# Patient Record
Sex: MALE | Race: WHITE
[De-identification: names, ages, dates, MRNs, and addresses within clinical notes are randomized per-mention and may not be internally consistent; named-entity substitution may affect disease eponyms.]

---

## 2020-02-06 ENCOUNTER — HOSPITAL ENCOUNTER (OUTPATIENT)
Dept: HOSPITAL 56 - MW.SDS | Age: 50
Discharge: HOME | End: 2020-02-06
Attending: SURGERY
Payer: COMMERCIAL

## 2020-02-06 DIAGNOSIS — Z88.0: ICD-10-CM

## 2020-02-06 DIAGNOSIS — K42.0: Primary | ICD-10-CM

## 2020-02-06 DIAGNOSIS — Z87.891: ICD-10-CM

## 2020-02-06 PROCEDURE — 49587: CPT

## 2020-02-06 NOTE — PCM.PREANE
Preanesthetic Assessment





- Anesthesia/Transfusion/Family Hx


Anesthesia History: Prior Anesthesia Without Reaction


Family History of Anesthesia Reaction: No


Transfusion History: No Prior Transfusion(s)





- Review of Systems


General: No Symptoms


Pulmonary: No Symptoms


Cardiovascular: No Symptoms


Gastrointestinal: No Symptoms


Neurological: Headache


Other: Reports: None





- Physical Assessment


NPO Status Date: 02/06/20


NPO Status Time: 08:00


Vital Signs: 





 Last Vital Signs











Temp  97.2 F   02/06/20 10:00


 


Pulse  89   02/06/20 10:00


 


Resp  18   02/06/20 10:00


 


BP  151/84 H  02/06/20 10:00


 


Pulse Ox  98   02/06/20 10:00











Height: 6 ft 2 in


Weight: 104.326 kg


ASA Class: 2


Mental Status: Alert & Oriented x3


Airway Class: Mallampati = 2


Dentition: Reports: Dentures (upper)


ROM/Head Extension: Full


Lungs: Clear to Auscultation, Normal Respiratory Effort


Cardiovascular: Regular Rate, Regular Rhythm





- Allergies


Allergies/Adverse Reactions: 


 Allergies











Allergy/AdvReac Type Severity Reaction Status Date / Time


 


Penicillins Allergy  Hives Verified 02/05/20 10:07














- Blood


Blood Available: No





- Anesthesia Plan


Pre-Op Medication Ordered: None





- Acknowledgements


Anesthesia Type Planned: General Anesthesia


Pt an Appropriate Candidate for the Planned Anesthesia: Yes


Alternatives and Risks of Anesthesia Discussed w Pt/Guardian: Yes


Pt/Guardian Understands and Agrees with Anesthesia Plan: Yes





PreAnesthesia Questionnaire


HEENT History: Reports: Other (See Below)


Other HEENT History: upper denture


Cardiovascular History: Reports: None


Respiratory History: Reports: None


Gastrointestinal History: Reports: Other (See Below)


Other Gastrointestinal History: occasional heartburn


Genitourinary History: Reports: None


Musculoskeletal History: Reports: None


Neurological History: Reports: None


Psychiatric History: Reports: None


Endocrine/Metabolic History: Reports: None


Hematologic History: Reports: None


Immunologic History: Reports: None


Oncologic (Cancer) History: Reports: None


Dermatologic History: Reports: None





- Past Surgical History


Head Surgeries/Procedures: Reports: None


HEENT Surgical History: Reports: Tonsillectomy


Cardiovascular Surgical History: Reports: None


Respiratory Surgical History: Reports: None


GI Surgical History: Reports: None


Male  Surgical History: Reports: None


Endocrine Surgical History: Reports: None


Neurological Surgical History: Reports: None


Musculoskeletal Surgical History: Reports: None


Oncologic Surgical History: Reports: None


Dermatological Surgical History: Reports: None





- SUBSTANCE USE


Smoking Status *Q: Former Smoker


Tobacco Use Within Last Twelve Months: Smokeless Tobacco





- HOME MEDS


Home Medications: 


 Home Meds





. [No Known Home Meds]  02/05/20 [History]











- CURRENT (IN HOUSE) MEDS


Current Meds: 





 Current Medications





Lactated Ringer's (Ringers, Lactated)  1,000 mls @ 100 mls/hr IV ASDIRECTED [facility]


   Last Admin: 02/06/20 10:30 Dose:  100 mls/hr

## 2020-02-06 NOTE — OR
SURGEON:

ELIZABETH ZAMUDIO MD

 

DATE OF PROCEDURE:  02/06/2020

 

PREOPERATIVE DIAGNOSIS:

Umbilical hernia.

 

POSTOPERATIVE DIAGNOSIS:

Umbilical hernia.

 

PROCEDURE PERFORMED:

Umbilical hernia repair.

 

PRIMARY SURGEON:

Elizabeth Zamudio MD.

 

ANESTHESIA:

General LMA.

 

FLUIDS:

800 mL of crystalloid.

 

ESTIMATED BLOOD LOSS:

5 mL.

 

FINDINGS:

An 8 mm defect just to the right of the umbilical stalk containing incarcerated

fat.

 

COMPLICATIONS:

None.

 

INDICATIONS:

The patient is a 49-year-old male who presents with a symptomatic umbilical

hernia that is incarcerated.  I explained the need for repair.  I explained the

procedure; expected perioperative course; and risks including bleeding,

infection, or damage to surrounding structures including perforation.  The

patient verbalized understanding and wishes to proceed.

 

PROCEDURE IN DETAIL:

The patient was brought into the operating room and placed on the OR table in

supine position.  A time-out was completed verifying the patient's name, age,

date of birth, allergies, and procedure to be performed.  General LMA anesthesia

was induced.  The abdomen was prepped and draped in usual standard fashion.  I

anesthetized the periumbilical area with 0.5% Marcaine plain.  A semi-circular

incision was made along the right side of the umbilical skin over the top of the

hernia sac.  This was done using a 15 blade.  Cautery was then used to dissect

into the level of the subcutaneous fat.  I first dissected the lateral edge of

the hernia sac out using a combination of sharp dissection with the Metzenbaum

scissors and electrocautery.  I then elevated the umbilical skin and sharply

took down the umbilical sac from underneath the umbilical skin.  This was

carried all the way down to the fascia.  The hernia sac was cleared away from

the remainder of its attachments using Metzenbaum scissors down to the level of

the fascia.  Once this was all cleared away, the hernia sac was entered.  It

contained preperitoneal fat.  The adhesions of this to the hernia sac were taken

down sharply using Metzenbaum scissors.  I was then able to reduce the hernia

sac contents through the fascial defect.  The hernia sac was taken off sharply

and passed off the table.  The fascial defect measured 8 mm in size.  The fascia

was then closed with 0 Ethibond suture, which was sutured in a figure-of-eight

fashion.  This adequately closed the fascia.  Electrocautery was used to achieve

hemostasis within the wound.  The wound was then closed with interrupted 3-0

Vicryl sutures in multiple layers bringing together the umbilical skin and

subcutaneous fat layer.  The skin was then closed with a running 4-0 Monocryl

stitch.  Dermabond and sterile dressings were applied.  The patient tolerated

the procedure well and was transferred to the PACU in stable condition.

 

 

EUNICE SINGH

DD:  02/06/2020 14:10:53

DT:  02/06/2020 18:40:33

Job #:  [id number]

## 2020-02-06 NOTE — PCM.OPNOTE
- General Post-Op/Procedure Note


Date of Surgery/Procedure: 02/06/20


Operative Procedure(s): Umbilical hernia repair


Findings: 


8mm umbilical hernia containing fat, incarcerated 


Pre Op Diagnosis: Umbilical hernia, incarcerated


Post-Op Diagnosis: same


Anesthesia Technique: General LMA


Primary Surgeon: Elizabeth Zamudio


Fluid Replacement, Intraop: 800


EBL in mLs: 5


Condition: Good

## 2020-02-06 NOTE — PCM.POSTAN
POST ANESTHESIA ASSESSMENT





- MENTAL STATUS


Mental Status: Alert, Oriented





- VITAL SIGNS


Vital Signs: 


 Last Vital Signs











Temp  97.2 F   02/06/20 14:05


 


Pulse  76   02/06/20 14:35


 


Resp  16   02/06/20 14:35


 


BP  124/76   02/06/20 14:35


 


Pulse Ox  94 L  02/06/20 14:35














- RESPIRATORY


Respiratory Status: Respiratory Rate WNL, Airway Patent, O2 Saturation Stable





- CARDIOVASCULAR


CV Status: Pulse Rate WNL, Blood Pressure Stable





- GASTROINTESTINAL


GI Status: No Symptoms





- POST OP HYDRATION


Hydration Status: Adequate & Stable

## 2020-02-06 NOTE — [id number]
Post Anesthesia Note





- EVALUATION WITHIN 48HRS OF ANESTHETIC


Vital Signs in Normal Range: Yes


Patient Participated in Evaluation: Yes


Respiratory Function Stable: Yes


Airway Patent: Yes


Cardiovascular Function Stable: Yes


Hydration Status Stable: Yes


Pain Control Satisfactory: Yes


Nausea and Vomiting Control Satisfactory: Yes


Mental Status Recovered: Yes


Vital Signs: 


 Last Vital Signs











Temp  97.2 F   02/06/20 14:05


 


Pulse  76   02/06/20 14:35


 


Resp  16   02/06/20 14:35


 


BP  124/76   02/06/20 14:35


 


Pulse Ox  94 L  02/06/20 14:35